# Patient Record
Sex: MALE | Race: OTHER | URBAN - METROPOLITAN AREA
[De-identification: names, ages, dates, MRNs, and addresses within clinical notes are randomized per-mention and may not be internally consistent; named-entity substitution may affect disease eponyms.]

---

## 2019-05-13 ENCOUNTER — OFFICE VISIT (OUTPATIENT)
Dept: FAMILY MEDICINE CLINIC | Age: 34
End: 2019-05-13
Payer: COMMERCIAL

## 2019-05-13 VITALS
DIASTOLIC BLOOD PRESSURE: 82 MMHG | WEIGHT: 148 LBS | SYSTOLIC BLOOD PRESSURE: 124 MMHG | TEMPERATURE: 98.5 F | HEIGHT: 67 IN | OXYGEN SATURATION: 99 % | HEART RATE: 65 BPM | BODY MASS INDEX: 23.23 KG/M2 | RESPIRATION RATE: 8 BRPM

## 2019-05-13 DIAGNOSIS — Z00.00 WELLNESS EXAMINATION: Primary | ICD-10-CM

## 2019-05-13 PROCEDURE — 99385 PREV VISIT NEW AGE 18-39: CPT | Performed by: FAMILY MEDICINE

## 2019-05-13 RX ORDER — KETOCONAZOLE 20 MG/ML
SHAMPOO TOPICAL DAILY PRN
COMMUNITY
End: 2020-06-16

## 2019-05-13 RX ORDER — KETOCONAZOLE 20 MG/G
CREAM TOPICAL DAILY
COMMUNITY
End: 2019-06-12 | Stop reason: SDUPTHER

## 2019-05-13 RX ORDER — CLOBETASOL PROPIONATE 0.05 MG/G
GEL TOPICAL 2 TIMES DAILY
COMMUNITY

## 2019-05-13 ASSESSMENT — PATIENT HEALTH QUESTIONNAIRE - PHQ9
SUM OF ALL RESPONSES TO PHQ QUESTIONS 1-9: 0
SUM OF ALL RESPONSES TO PHQ QUESTIONS 1-9: 0
SUM OF ALL RESPONSES TO PHQ9 QUESTIONS 1 & 2: 0
2. FEELING DOWN, DEPRESSED OR HOPELESS: 0
1. LITTLE INTEREST OR PLEASURE IN DOING THINGS: 0

## 2019-05-13 NOTE — PATIENT INSTRUCTIONS
1) Meet with a dermatologist.  Check with insurance plan who is network. You might have best response with one of the dermatology practices in [facility] [place] ([facility]). 2) Let your PCP know if you need any refills in the meantime. 3) Continue to find ways to work on mindfulness and stress reduction  4) Get labwork done fasting conditions in next 30 days. 5) Contact your PCP if you would like to see a physical therapist for the right ankle. --You can get your lab work, x-ray, MRI, or ultrasound at our nearest [facility] location across the parking lot at   [employer]  Lab hours ([address] Monday through Friday; 8AM - noon on Saturdays),   Ph# [phone]  Radiology hours (7:00AM - 5PM Monday through Friday only)  --Call our office in 1 week if you have not heard about the results. Or check [facility] if you have previously enrolled. Relaxation Resources  It can be very helpful to use tools like relaxed breathing, muscle relaxation, and guided imagery/visualization to cope with stress, pain, anxiety and depression. Try different techniques to find the ones that work best for you. Below are 2 websites that have several breathing, relaxation, and visualization exercises that you can listen to and download for free.    [email or web address].[email or web address] html  · Deep Breathing & Guided Relaxation Exercises (3)  · Guided Imagery/Visualization Exercises (5)  · Mindfulness & Meditation Exercises (3)  · Progressive Muscle Relaxation   · Soothing Instrumental Music (11)    [email or web address]  · Diaphragmatic Breathing   · Deep Breathing I   · Deep Breathing II   · Progressive Muscle Relaxation   · Guided Imagery: The [employer]   · Guided Imagery:  [facility]   · Relaxing Phrases   · Just This Breath   · Increasing Awareness   · Sending Thoughts Away on Clouds  · Sending Thoughts Away on Leaves  · Sorting Into Boxes -----------------------------------------------------  Below are several apps that you can download to your smartphone to help with relaxation and mood coping. [email or web address]  Platform: [employer]  Cost: Free  Your breathing has a profound effect on your body. Dot Lafleur know this fact to be true if youve ever taken deep breaths to calm yourself down when you were upset. That exercise can often make you feel more centered, and its proof that breathing is powerful. The [id number] elidia uses guided breathing exercises to help reduce symptoms of an anxiety attack. If an attack is coming or the symptoms are unbearable, slip away into a quiet room, open your elidia, and let the worry and stress slip away with each breath. Universal Breathing - Pranayama  Platform: [employer]  Cost: Free  Focused breathing exercises can help you regain composure during an anxiety attack. They can also help you prevent an anxiety attack before one starts. Pranayama breathing techniques are common in yoga and have powerful benefits. If youre a beginner, you can benefit from the elidias guided breathing instruction. Dot Lafleur learn how to breathe deeply, hold, and then release with better control. If it works for you, you can purchase the full course which gives you access to the entire program.  Breathing Zone   Platform: [employer]  Cost: $3.99   Breathing Zone uses a clinically proven therapeutic breathing exercise that decreases your heart rate, and with daily use can help manage high blood pressure.    ----------------------------------------------  Headspace: Guided Meditation and Mindfulness  Platform: [employer]  Cost: Monthly subscription starting at $12.99  This elidia provides guided meditations suitable for all levels to help improve focus, exercise mindful awareness, relieve anxiety and reduce stress.   Calm: Meditation to Relax, Focus & Sleep Better  Platform: [employer]  Cost: Monthly subscription starting at $9.99  This elidia provides guided meditations for all levels, available in different lengths (3, 5, 10, 15, 20 or 25 minutes) on a variety of topics. 10% Happier: Meditation for [employer]: iPhone  Cost: Monthly subscription starting at $9.99  This elidia was written by a [employer] and includes a variety of meditation teachers who teach meditation in an accessible way. Self-Help for Anxiety Management [facility])  Platform: iPhone & Android  Cost: Free  The Self-Help for Anxiety Management [facility]) elidia from the [employer] can help you regain control of your anxiety and emotions. Tell the elidia how youre feeling, how anxious you are, or how worried you are. Then let the elidias self-help features walk you through some calming or relaxation practices. If you want, you can connect with a social network of other [facility] users. Dont worry, the network isnt connected to larger networks like Twitter or [employer]. Stop Panic & Anxiety Help  Platform: Android  Cost: Free  If panic and anxiety attacks have a  on your life, this elidia might help you let them go. The Stop Panic & Anxiety Help Android elidia uses emotion and relaxation training audio tracks to help you fight your fears and find a state of calm. When youve overcome the attack, use the Quigo journal to record what caused the attack and how you were able to get through it. Then use this journal to learn from your experiences and prepare for the future. I Can Be Fearless by [employer]  Platform: iPhone  Cost: Free  When you were younger, your parents might have told you that you could do anything you put your mind to. This elidia might not help you be an astronaut or a world famous actress, but it can help you break through your anxiety, fears, and worries to a place of calm and confidence.  Open your Apple device and select what you want to be right now -- calm, motivated, and confident are among the options -- then let the audio hypnosis guide you through a session. Anti-Anxiety   Platform: Android  Cost: Free  You can tell the elidia your problems by taking a diagnostic quiz about your level of stress and anxiety. Using your answers, the elidia will design a custom treatment plan for you. Follow instructional self-help videos like How to Tolerate and Lessen Anxiety.  Keep a daily journal of your anxiety and worries, and track your progress as you learn to regain a sense of calm. Worry Box - Anxiety Self-Help  Platform: Android  Cost: Free  Have you ever wished you could put all your worries in a box, leave them there, and walk away? The Worry Box elidia may let you do just that. The elidia functions a lot like a journal: Write down your thoughts, anxieties, and worries, and let the elidia help you think them through. It will ask questions, give specific anxiety-reducing help, and can even direct you to help you reduce your worries and anxiety. It is all password protected, so you can feel safe sharing the details of your stresses. -----------------------------------------------    Relax Melodies  Platform: iPhone and Android  Cost: Free  Anxiety can disrupt healthy sleep patterns in more than one way. First, people who dont get enough sleep tend to feel more anxious. Then, people who are more anxious have a difficult time sleeping. Creating a calming environment may help you fall asleep and stay asleep. Relax to one of this elidias 50 sounds. Need the music to stop once youre asleep? Set a timer, and it will stop playing. Set an alarm when you need to be awake. Then, enjoy the benefits of a good nights sleep, free from anxiety. Relaxing Sounds of Nature - Lite  Platform: iPhone  Cost: Free  You can find rest and relaxation without having to travel. The elidia comes with 35 nature tracks, which include soothing classics like crickets chirping, breaking waves, and a serene lake.  You can download more free tracks to [employer], and customize a favorite combination that helps you reduce your anxiety in a peaceful setting. Allow the sounds of nature to sweep you away from your worries in the comfort of your living room, office, or bedroom. Nature Sounds Relax and Sleep  Platform: Android  Cost: Free  If you find yourself longing for the sound of the ocean to help you relax, the Lucius Hannifin and Sleep elidia is for you. Open this elidia whenever youre feeling anxious or stressed. You can select locations or sounds like the jungle, ocean, or thunder and slip away into a place of relaxation and comfort. If the sounds make you feel sleepy, even better. Use the elidia to doze off into a relaxing slumber  Calming Music to Simplicity  Platform: Android  Cost: Free  [employer] arent the only relaxing tunes in smartphone apps. Music, especially some traditional [place] music, can be relaxing and soothing. The Calming Music to Simplicity elidia contains nine traditional [place] music selections. Press play and let your worries melt away. Relax Ocean Waves Sleep by Arnica  Platform: Android  Cost: Free  Living far from a beach doesnt mean you have to be far from total relaxation. Slip on a set of headphones and drift into a distant sand-and-suds oasis. Whether youre trying to head off an anxiety attack or just need to get some good sleep after a few anxious days, the Relax Ocean Waves Sleep elidia helps you find a place of serenity.  --------------------------------------------------------------  Nitesh [place] Meditation Relaxation  Platform: Android  Cost: Free  Nitesh [place] is a traditional [facility] health system that brings together posture, breathing, and the mind to reduce anxiety. This Android elidia connects users with a library of relaxation videos that contain instructions for relaxing and clearing the mind. The videos are created by Dr. Erica Castellanos, a psychologist with more than 20 years of experience.  In addition to viewing Dr. Radha Kendrick videos, you can read a variety of articles related to anxiety, meditation, and stress management. Anxiety Free  Platform: [employer]   Cost: Free  Meditation requires mindfulness and a sense of presence in your thoughts. Hypnosis is one step beyond meditation. It works by sending signals to your brain and transforming it almost unknowingly. The creators of this elidia say that its audio recordings contain subliminal signals that speak to the subconscious with powerful effect.  Hypnosis, like meditation, requires practice, and the goal is to get each user to a place where self-hypnosis is possible in order to reduce anxiety. Relax & Rest Guided Meditations  Platform: iPhone and Android  Cost: $0.99  While group meetings and discussions are always an option, some people find relaxation more easily on their own. This elidia lets you relax in the space of your own home or office with three guided meditations. Breath Awareness Guided Meditation (5 minutes), Deep Rested Guided Meditation (13 minutes), and Whole Body Guided Relaxation (24 minutes) are designed specifically to help you relax and sink into a peaceful meditation moment. Equanimity - Meditation Timer & Tracker  Platform: [employer]   Cost: $4.99  Meditation is one way you can cope with the symptoms and side effects of anxiety. People who meditate can eventually train themselves to stay calm when feeling stressed or anxious. Equanimity - Meditation Timer & Tracker is simple and straightforward. Time each session and watch for visual light cues to let you know how long youve been meditating. Take notes about each session, and watch your progress as you learn to manage your stress and anxiety.   Virtual Hope Box  Platform: iPhone and Android  Cost: Free  The Automatic Data Box (VHB) is a smartphone application that contains simple tools to help with coping, relaxation, distraction, and positive thinking via personalized supportive audio, video, pictures, games, mindfulness exercises, positive messages and activity planning, inspirational quotes, coping statements, and other tools. Acupressure: Heal Yourself  Platform: iPhone and Android  Cost: $1.99  Acupressure is a natural healing strategy in which you target specific areas of the body in order to alleviate pain or unwanted symptoms. Acupressure can also increase blood flow, which can boost your mood and your health. This elidia helps you find your bodys acupressure points. Apply pressure on those points when youre feeling overwhelmed, and receive the positive, calming benefit  PTSD : Self-Management of Posttraumatic Stress  Platform: [employer]  Cost: Free  This elidia can help you learn about and manage symptoms that often occur after trauma. Provides information and coping skills for common kinds of posttraumatic stress symptoms and problems, including systematic relaxation and self-help techniques.

## 2019-05-13 NOTE — PROGRESS NOTES
[facility]  Progress Note  DO Bucky Latif  1985 05/13/19    Chief Complaint:   Bucky De La Cruz is a 34 y.o. male who is here for wellness. HPI:   Some increased stress in life with his work. Intermittent neck discomfort. Had right ankle sprain. When he gets anxious his vestibular neuronitis worsens. Meclizine has helped. Would like to see dermatologist for the rosacea. ROS negative for headache, vision changes, chest pain, shortness of breath, abdominal pain, urinary sx, bowel changes. Past medical, surgical, and social history reviewed. Originally from [facility]  Medications and allergies reviewed. No Known Allergies  Prior to Visit Medications    Medication Sig Taking? Authorizing Provider   clobetasol propionate 0.05 % GEL gel Apply topically 2 times daily Yes Historical Provider, MD   ketoconazole (NIZORAL) 2 % cream Apply topically daily Apply topically daily. Yes Historical Provider, MD   metroNIDAZOLE (METROCREAM) 0.75 % cream Apply topically 2 times daily Apply topically 2 times daily. Yes Historical Provider, MD   ketoconazole (NIZORAL) 2 % shampoo Apply topically daily as needed for Itching Apply topically daily as needed. Yes Historical Provider, MD          Vitals:    05/13/19 0830   BP: 124/82   Pulse: 65   Resp: 8   Temp: 98.5 °F (36.9 °C)   TempSrc: Oral   SpO2: 99%   Weight: 148 lb (67.1 kg)   Height: 5' 7\" (1.702 m)      Wt Readings from Last 3 Encounters:   05/13/19 148 lb (67.1 kg)     BP Readings from Last 3 Encounters:   05/13/19 124/82       There is no problem list on file for this patient. Immunization History   Administered Date(s) Administered    Tdap (Boostrix, Adacel) 05/15/2014       History reviewed. No pertinent past medical history.   Past Surgical History:   Procedure Laterality Date    TOOTH EXTRACTION  2016     Family History   Problem Relation Age of Onset    Heart Disease Maternal Grandfather  Cancer Paternal Uncle         skin cancer    Lung Cancer Paternal Uncle      Social History     Socioeconomic History    Marital status: Single     Spouse name: Not on file    Number of children: Not on file    Years of education: Not on file    Highest education level: Not on file   Occupational History    Not on file   Social Needs    Financial resource strain: Not on file    Food insecurity:     Worry: Not on file     Inability: Not on file    Transportation needs:     Medical: Not on file     Non-medical: Not on file   Tobacco Use    Smoking status: Never Smoker    Smokeless tobacco: Never Used   Substance and Sexual Activity    Alcohol use: Not on file    Drug use: Not on file    Sexual activity: Not on file   Lifestyle    Physical activity:     Days per week: Not on file     Minutes per session: Not on file    Stress: Not on file   Relationships    Social connections:     Talks on phone: Not on file     Gets together: Not on file     Attends Quaker service: Not on file     Active member of club or organization: Not on file     Attends meetings of clubs or organizations: Not on file     Relationship status: Not on file    Intimate partner violence:     Fear of current or ex partner: Not on file     Emotionally abused: Not on file     Physically abused: Not on file     Forced sexual activity: Not on file   Other Topics Concern    Not on file   Social History Narrative    Not on file       O: /82   Pulse 65   Temp 98.5 °F (36.9 °C) (Oral)   Resp 8   Ht 5' 7\" (1.702 m)   Wt 148 lb (67.1 kg)   SpO2 99%   BMI 23.18 kg/m²   Physical Exam  GEN: No acute distress, cooperative, well nourished, alert. HEENT: PEERLA, EOMI , normocephalic/atraumatic, nares and oropharynx clear. Mucus membranes normal, Tympanic membranes clear bilaterally. Neck: soft, supple, no thyromegaly, mass, no Lymphadenopathy  CV: Regular rate and rhythm, no murmur, rubs, gallops. No edema.   Resp: Clear to auscultation bilaterally good air entry bilaterally  No crackles, wheeze. Breathing comfortably. Psych: normal affect. Neuro: AOx3  Integument: left cheek with pustular rosacea. Scalp on vertex with seborrheic dermatitis lesions. MSK: Normal ROM of the right ankle No TTP of the lateral ankle. ASSESSMENT   Diagnosis Orders   1. Wellness examination  HIV-1 AND HIV-2 ANTIBODIES    LIPID PANEL    COMPREHENSIVE METABOLIC PANEL     Get labwork      PLAN          See rest of plan under patient instructions. Return in about 1 year (around 5/13/2020) for Wellness/Health Maintenance. Patient Instructions   1) Meet with a dermatologist.  Check with insurance plan who is network. You might have best response with one of the dermatology practices in [facility] ([facility]). 2) Let your PCP know if you need any refills in the meantime. 3) Continue to find ways to work on mindfulness and stress reduction  4) Get labwork done fasting conditions in next 30 days. 5) Contact your PCP if you would like to see a physical therapist for the right ankle. --You can get your lab work, x-ray, MRI, or ultrasound at our nearest [facility] location across the parking lot at   [address]   Lab hours ([address] Monday through Friday; 8AM - noon on Saturdays),   Ph# [phone]  Radiology hours (7:00AM - 5PM Monday through Friday only)  --Call our office in 1 week if you have not heard about the results. Or check [facility] if you have previously enrolled. Relaxation Resources  It can be very helpful to use tools like relaxed breathing, muscle relaxation, and guided imagery/visualization to cope with stress, pain, anxiety and depression. Try different techniques to find the ones that work best for you.      Below are 2 websites that have several breathing, relaxation, and visualization exercises that you can listen to and download for free.    NetworkMan [email or web address]. html  · Deep Breathing & Guided Relaxation Exercises (3)  · Guided Imagery/Visualization Exercises (5)  · Mindfulness & Meditation Exercises (3)  · Progressive Muscle Relaxation   · Soothing Instrumental Music (11)    http://Wingz. "[employer]/relax/  · Diaphragmatic Breathing   · Deep Breathing I   · Deep Breathing II   · Progressive Muscle Relaxation   · Guided Imagery: [place]   · Guided Imagery: [facility]   · Relaxing Phrases   · Just This Breath   · Increasing Awareness   · Sending Thoughts Away on Clouds  · Sending Thoughts Away on Leaves  · Sorting Into Boxes   -----------------------------------------------------  Below are several apps that you can download to your smartphone to help with relaxation and mood coping. Rxpeizr9Ghmtv  Platform: Showcase-TV  Cost: Free  Your breathing has a profound effect on your body. Katie Wilkinson know this fact to be true if youve ever taken deep breaths to calm yourself down when you were upset. That exercise can often make you feel more centered, and its proof that breathing is powerful. The Rkigibh5Efyjo elidia uses guided breathing exercises to help reduce symptoms of an anxiety attack. If an attack is coming or the symptoms are unbearable, slip away into a quiet room, open your elidia, and let the worry and stress slip away with each breath. Universal Breathing - Pranayama  Platform: Showcase-TV  Cost: Free  Focused breathing exercises can help you regain composure during an anxiety attack. They can also help you prevent an anxiety attack before one starts. Pranayama breathing techniques are common in yoga and have powerful benefits. If youre a beginner, you can benefit from the elidias guided breathing instruction. Katie Wilkinson learn how to breathe deeply, hold, and then release with better control.  If it works for you, you can purchase the full course which gives you access to the entire program.  Breathing Zone   Platform: Exitround & Android  Cost: $3.99   Breathing Zone uses a clinically proven therapeutic breathing exercise that decreases your heart rate, and with daily use can help manage high blood pressure.    ----------------------------------------------  Headspace: Guided Meditation and Mindfulness  Platform: iPhone  Cost: Monthly subscription starting at $12.99  This elidia provides guided meditations suitable for all levels to help improve focus, exercise mindful awareness, relieve anxiety and reduce stress. Calm: Meditation to Relax, Focus & Sleep Better  Platform: iPhone  Cost: Monthly subscription starting at $9.99  This elidia provides guided meditations for all levels, available in different lengths (3, 5, 10, 15, 20 or 25 minutes) on a variety of topics. 10% Happier: Meditation for [employer]: iPSjh direct marketing conceptsne  Cost: Monthly subscription starting at $9.99  This elidia was written by a [employer] and includes a variety of meditation teachers who teach meditation in an accessible way. Self-Help for Anxiety Management [facility])  Platform: iPhone & Android  Cost: Free  The Self-Help for Anxiety Management [facility]) elidia from the [employer] can help you regain control of your anxiety and emotions. Tell the elidia how youre feeling, how anxious you are, or how worried you are. Then let the elidias self-help features walk you through some calming or relaxation practices. If you want, you can connect with a social network of other [facility] users. Dont worry, the network isnt connected to larger networks like Twitter or [employer]. Stop Panic & Anxiety Help  Platform: Android  Cost: Free  If panic and anxiety attacks have a  on your life, this elidia might help you let them go. The Stop Panic & Anxiety Help Android elidia uses emotion and relaxation training audio tracks to help you fight your fears and find a state of calm.  When youve overcome the attack, use the elidias journal to record what caused the attack and how you were able to get through it. Then use this journal to learn from your experiences and prepare for the future. I Can Be Fearless by Human Progress  Platform: iPhone  Cost: Free  When you were younger, your parents might have told you that you could do anything you put your mind to. This elidia might not help you be an astronaut or a world famous actress, but it can help you break through your anxiety, fears, and worries to a place of calm and confidence. Open your Apple device and select what you want to be right now -- calm, motivated, and confident are among the options -- then let the audio hypnosis guide you through a session. Anti-Anxiety   Platform: Android  Cost: Free  You can tell the elidia your problems by taking a diagnostic quiz about your level of stress and anxiety. Using your answers, the elidia will design a custom treatment plan for you. Follow instructional self-help videos like How to Tolerate and Lessen Anxiety.  Keep a daily journal of your anxiety and worries, and track your progress as you learn to regain a sense of calm. Worry Box - Anxiety Self-Help  Platform: Android  Cost: Free  Have you ever wished you could put all your worries in a box, leave them there, and walk away? The Worry Box elidia may let you do just that. The elidia functions a lot like a journal: Write down your thoughts, anxieties, and worries, and let the elidia help you think them through. It will ask questions, give specific anxiety-reducing help, and can even direct you to help you reduce your worries and anxiety. It is all password protected, so you can feel safe sharing the details of your stresses. -----------------------------------------------    Relax Melodies  Platform: iPhone and Android  Cost: Free  Anxiety can disrupt healthy sleep patterns in more than one way. First, people who dont get enough sleep tend to feel more anxious.  Then, people who are more anxious have a difficult time sleeping. Creating a calming environment may help you fall asleep and stay asleep. Relax to one of this elidias 50 sounds. Need the music to stop once youre asleep? Set a timer, and it will stop playing. Set an alarm when you need to be awake. Then, enjoy the benefits of a good nights sleep, free from anxiety. Relaxing Sounds of Nature - Lite  Platform: iPhone  Cost: Free  You can find rest and relaxation without having to travel. The elidia comes with 35 nature tracks, which include soothing classics like crickets chirping, breaking waves, and a serene lake. You can download more free tracks to [employer], and customize a favorite combination that helps you reduce your anxiety in a peaceful setting. Allow the sounds of nature to sweep you away from your worries in the comfort of your living room, office, or bedroom. Nature Sounds Relax and Sleep  Platform: Android  Cost: Free  If you find yourself longing for the sound of the ocean to help you relax, the better.nifin and Sleep elidia is for you. Open this elidia whenever youre feeling anxious or stressed. You can select locations or sounds like the jungle, ocean, or thunder and slip away into a place of relaxation and comfort. If the sounds make you feel sleepy, even better. Use the elidia to doze off into a relaxing slumber  Calming Music to Simplicity  Platform: Android  Cost: Free  [employer] arent the only relaxing tunes in smartphone apps. Music, especially some traditional [place] music, can be relaxing and soothing. The Calming Music to Simplicity elidia contains nine traditional [place] music selections. Press play and let your worries melt away. Relax Ocean Waves Sleep by [employer]  Platform: Android  Cost: Free  Living far from a beach doesnt mean you have to be far from total relaxation. Slip on a set of headphones and drift into a distant sand-and-suds oasis.  Whether youre trying to head off an anxiety attack or just need to get some good sleep after a few anxious days, the Relax Ocean Waves Sleep elidia helps you find a place of serenity.  --------------------------------------------------------------  Arthea Shar Meditation Relaxation  Platform: Android  Cost: Free  Daisy[employer] is a traditional [place] health system that brings together posture, breathing, and the mind to reduce anxiety. This Android elidia connects users with a library of relaxation videos that contain instructions for relaxing and clearing the mind. The videos are created by Dr. Abigail Fulton, a psychologist with more than 20 years of experience. In addition to viewing Dr. Adrien Perez videos, you can read a variety of articles related to anxiety, meditation, and stress management. Anxiety Free  Platform: [employer]   Cost: Free  Meditation requires mindfulness and a sense of presence in your thoughts. Hypnosis is one step beyond meditation. It works by sending signals to your brain and transforming it almost unknowingly. The creators of this elidia say that its audio recordings contain subliminal signals that speak to the subconscious with powerful effect.  Hypnosis, like meditation, requires practice, and the goal is to get each user to a place where self-hypnosis is possible in order to reduce anxiety. Relax & Rest Guided Meditations  Platform: iPhone and TheSquareFoot  Cost: $0.99  While group meetings and discussions are always an option, some people find relaxation more easily on their own. This elidia lets you relax in the space of your own home or office with three guided meditations. Breath Awareness Guided Meditation (5 minutes), Deep Rested Guided Meditation (13 minutes), and Whole Body Guided Relaxation (24 minutes) are designed specifically to help you relax and sink into a peaceful meditation moment. Equanimity - Meditation Timer & Tracker  Platform: [employer]   Cost: $4.99  Meditation is one way you can cope with the symptoms and side effects of anxiety.  People who meditate can eventually train themselves to stay calm when feeling stressed or anxious. Equanimity - Meditation Timer & Tracker is simple and straightforward. Time each session and watch for visual light cues to let you know how long youve been meditating. Take notes about each session, and watch your progress as you learn to manage your stress and anxiety. Virtual Hope Box  Platform: TapnScraphone and Android  Cost: Free  The Automatic Data Box (VHB) is a smartphone application that contains simple tools to help with coping, relaxation, distraction, and positive thinking via personalized supportive audio, video, pictures, games, mindfulness exercises, positive messages and activity planning, inspirational quotes, coping statements, and other tools. Acupressure: Heal Yourself  Platform: iPhone and Android  Cost: $1.99  Acupressure is a natural healing strategy in which you target specific areas of the body in order to alleviate pain or unwanted symptoms. Acupressure can also increase blood flow, which can boost your mood and your health. This elidia helps you find your bodys acupressure points. Apply pressure on those points when youre feeling overwhelmed, and receive the positive, calming benefit  PTSD : Self-Management of Posttraumatic Stress  Platform: BitWave and MediaBoost  Cost: Free  This elidia can help you learn about and manage symptoms that often occur after trauma. Provides information and coping skills for common kinds of posttraumatic stress symptoms and problems, including systematic relaxation and self-help techniques. Please note a portion of this chart was generated using dragon dictation software. Although every effort was made to ensure the accuracy of this automated transcription, some errors in transcription may have occurred.

## 2019-06-12 ENCOUNTER — PATIENT MESSAGE (OUTPATIENT)
Dept: FAMILY MEDICINE CLINIC | Age: 34
End: 2019-06-12

## 2019-06-12 DIAGNOSIS — Z00.00 WELLNESS EXAMINATION: ICD-10-CM

## 2019-06-12 LAB
A/G RATIO: 2.2 (ref 1.1–2.2)
ALBUMIN SERPL-MCNC: 5.2 G/DL (ref 3.4–5)
ALP BLD-CCNC: 67 U/L (ref 40–129)
ALT SERPL-CCNC: 18 U/L (ref 10–40)
ANION GAP SERPL CALCULATED.3IONS-SCNC: 12 MMOL/L (ref 3–16)
AST SERPL-CCNC: 18 U/L (ref 15–37)
BILIRUB SERPL-MCNC: 0.6 MG/DL (ref 0–1)
BUN BLDV-MCNC: 14 MG/DL (ref 7–20)
CALCIUM SERPL-MCNC: 9.7 MG/DL (ref 8.3–10.6)
CHLORIDE BLD-SCNC: 105 MMOL/L (ref 99–110)
CHOLESTEROL, TOTAL: 202 MG/DL (ref 0–199)
CO2: 25 MMOL/L (ref 21–32)
CREAT SERPL-MCNC: 0.9 MG/DL (ref 0.9–1.3)
GFR AFRICAN AMERICAN: >60
GFR NON-AFRICAN AMERICAN: >60
GLOBULIN: 2.4 G/DL
GLUCOSE BLD-MCNC: 96 MG/DL (ref 70–99)
HDLC SERPL-MCNC: 76 MG/DL (ref 40–60)
LDL CHOLESTEROL CALCULATED: 113 MG/DL
POTASSIUM SERPL-SCNC: 4.5 MMOL/L (ref 3.5–5.1)
SODIUM BLD-SCNC: 142 MMOL/L (ref 136–145)
TOTAL PROTEIN: 7.6 G/DL (ref 6.4–8.2)
TRIGL SERPL-MCNC: 63 MG/DL (ref 0–150)
VLDLC SERPL CALC-MCNC: 13 MG/DL

## 2019-06-12 NOTE — TELEPHONE ENCOUNTER
From: Mari Houston  To: Elvira Gibson DO  Sent: 6/12/2019 1:34 PM EDT  Subject: Non-Urgent Medical Question    Julius Gallardo,     I need a refill for my metrocream and ketoconazole creams, but the webpage is not letting me place a request.     What should I do?      Thanks,

## 2019-06-13 LAB
HIV AG/AB: NORMAL
HIV ANTIGEN: NORMAL
HIV-1 ANTIBODY: NORMAL
HIV-2 AB: NORMAL

## 2019-06-14 RX ORDER — KETOCONAZOLE 20 MG/G
CREAM TOPICAL DAILY
Qty: 30 G | Refills: 0 | Status: SHIPPED | OUTPATIENT
Start: 2019-06-14

## 2020-06-16 ENCOUNTER — OFFICE VISIT (OUTPATIENT)
Dept: FAMILY MEDICINE CLINIC | Age: 35
End: 2020-06-16
Payer: COMMERCIAL

## 2020-06-16 VITALS
HEART RATE: 59 BPM | OXYGEN SATURATION: 98 % | HEIGHT: 67 IN | TEMPERATURE: 98.5 F | RESPIRATION RATE: 18 BRPM | BODY MASS INDEX: 23.42 KG/M2 | DIASTOLIC BLOOD PRESSURE: 71 MMHG | WEIGHT: 149.2 LBS | SYSTOLIC BLOOD PRESSURE: 120 MMHG

## 2020-06-16 PROCEDURE — 99213 OFFICE O/P EST LOW 20 MIN: CPT | Performed by: FAMILY MEDICINE

## 2020-06-16 RX ORDER — DOXYCYCLINE HYCLATE 100 MG/1
100 CAPSULE ORAL 2 TIMES DAILY
COMMUNITY

## 2020-06-16 ASSESSMENT — PATIENT HEALTH QUESTIONNAIRE - PHQ9
SUM OF ALL RESPONSES TO PHQ QUESTIONS 1-9: 0
2. FEELING DOWN, DEPRESSED OR HOPELESS: 0
SUM OF ALL RESPONSES TO PHQ9 QUESTIONS 1 & 2: 0
SUM OF ALL RESPONSES TO PHQ QUESTIONS 1-9: 0
1. LITTLE INTEREST OR PLEASURE IN DOING THINGS: 0

## 2020-06-16 NOTE — PATIENT INSTRUCTIONS
--Call to meet with Dr. Kari uDenas. --Keep up partnership with Ranjit Nix, [facility]    --Call to set up eye clinic appt. --You can get your lab work or x-ray done at [facility]. LAB: [address]  Lab hours (7AM - 5PM Monday through Friday; 8AM - noon on Saturdays),   Ph# [phone]    X-RAY: [address]  Radiology hours, (7:00AM - 5PM Monday through Friday only)  Ph# [phone] or [phone]  --Call our office in 1 week if you have not heard about the results. Or check [facility] if you have previously enrolled. Address  [facility] Location  [address] [place] [address], [address] [place] [address]  Phone: [phone]  Fax: [phone]  Hours  Monday: 8:30 am - 5 pm  Tuesday: 8:30 am - 5 pm  Wednesday: 8:30 am - 3 pm  Thursday: 8:30 am - 5 pm  Friday: 8:30 am - 5 pm  Doctors at this Location  Jax Patricia M.D. Glennda Riggers A. Zoila Riedel, M.D.

## 2020-06-16 NOTE — PROGRESS NOTES
bilaterally. Neck: soft, supple, no thyromegaly,mass, no Lymphadenopathy  CV: Regular rate and rhythm, no murmur, rubs, gallops. No edema. Resp: Clear to auscultation bilaterally good air entry bilaterally  No crackles, wheeze. Breathing comfortably. Psych:normal affect. Neuro: AOx3          ASSESSMENT   Diagnosis Orders   1. Chronic pain of right ankle  Magda Daly MD, Orthopedic Surgery, [facility]       Still painful after 1 year. Wants to return back to regular competitive tennis play. PLAN          If applicable, see additional patient information and instructions under \"Patient Instructions. \"    Return if symptoms worsen or fail to improve. Patient Instructions   --Call to meet with Dr. Steph Haynes. --Keep up partnership with [facility] [address] [place] [address] Dr    --Call to set up eye clinic appt. --You can get your lab work or x-ray done at [facility]. LAB: [address]  Lab hours (7AM - 5PM Monday through Friday; 8AM - noon on Saturdays),   Ph# [phone]    X-RAY: [address]  Radiology hours, (7:00AM - 5PM Monday through Friday only)  Ph# [phone] or [phone]  --Call our office in 1 week if you have not heard about the results. Or check [facility] if you have previously enrolled. Address  [facility] Location  [address] [place] [address], [address] [place] [address]  Phone: [phone]  Fax: [phone]  Hours  Monday: 8:30 am - 5 pm  Tuesday: 8:30 am - 5 pm  Wednesday: 8:30 am - 3 pm  Thursday: 8:30 am - 5 pm  Friday: 8:30 am - 5 pm  Doctors at this Location  FIDEL Wiggins M.D. Please note a portion of this chart was generated using dragon dictation software. Although every effort was made to ensure the accuracy of this automated transcription,some errors in transcription may have occurred.

## 2021-06-21 ENCOUNTER — OFFICE VISIT (OUTPATIENT)
Dept: FAMILY MEDICINE CLINIC | Age: 36
End: 2021-06-21
Payer: COMMERCIAL

## 2021-06-21 VITALS
RESPIRATION RATE: 18 BRPM | OXYGEN SATURATION: 99 % | HEIGHT: 67 IN | WEIGHT: 145.4 LBS | TEMPERATURE: 97.5 F | BODY MASS INDEX: 22.82 KG/M2 | DIASTOLIC BLOOD PRESSURE: 76 MMHG | HEART RATE: 55 BPM | SYSTOLIC BLOOD PRESSURE: 124 MMHG

## 2021-06-21 DIAGNOSIS — Z00.00 ROUTINE GENERAL MEDICAL EXAMINATION AT A HEALTH CARE FACILITY: Primary | ICD-10-CM

## 2021-06-21 DIAGNOSIS — R53.83 DECREASED STAMINA: ICD-10-CM

## 2021-06-21 PROCEDURE — 99395 PREV VISIT EST AGE 18-39: CPT | Performed by: FAMILY MEDICINE

## 2021-06-21 SDOH — ECONOMIC STABILITY: FOOD INSECURITY: WITHIN THE PAST 12 MONTHS, THE FOOD YOU BOUGHT JUST DIDN'T LAST AND YOU DIDN'T HAVE MONEY TO GET MORE.: NEVER TRUE

## 2021-06-21 SDOH — ECONOMIC STABILITY: FOOD INSECURITY: WITHIN THE PAST 12 MONTHS, YOU WORRIED THAT YOUR FOOD WOULD RUN OUT BEFORE YOU GOT MONEY TO BUY MORE.: NEVER TRUE

## 2021-06-21 ASSESSMENT — PATIENT HEALTH QUESTIONNAIRE - PHQ9
SUM OF ALL RESPONSES TO PHQ QUESTIONS 1-9: 0
SUM OF ALL RESPONSES TO PHQ QUESTIONS 1-9: 0
1. LITTLE INTEREST OR PLEASURE IN DOING THINGS: 0
SUM OF ALL RESPONSES TO PHQ9 QUESTIONS 1 & 2: 0
2. FEELING DOWN, DEPRESSED OR HOPELESS: 0
SUM OF ALL RESPONSES TO PHQ QUESTIONS 1-9: 0

## 2021-06-21 ASSESSMENT — SOCIAL DETERMINANTS OF HEALTH (SDOH): HOW HARD IS IT FOR YOU TO PAY FOR THE VERY BASICS LIKE FOOD, HOUSING, MEDICAL CARE, AND HEATING?: NOT HARD AT ALL

## 2021-06-21 NOTE — PROGRESS NOTES
[facility] Family Medicine  TELEMEDICINE Progress Note  Betzaida Reddy DO      The risks and benefits of converting to a virtual visit were discussed in light of the current infectious disease epidemic. Patient also understood that insurance coverage and co-pays are up to their individual insurance plans. Patient identification was verified at the start of the visit. Mo Lawrence  1985 06/21/21    Patient location: Home address on file  Provider location: Physician's home    Chief Complaint:   Mo Lawrence is a 37 y.o. patient who is here for yearly physical        HPI:     Otherwise has been healthy and continues to play competitive tennis. When [place] to [place] he had to see a urologist for scrotum condition. Computer medical record reviewed with Jose Reyes today. This has since resolved from October 2020. Additionally has some occasional upset stomach on the doxycycline 100 mg which at this time is taking daily as prescribed by his dermatologist.  Has to take Tums periodically a couple of times a month. Denies alarm features of blood in the toilet bowl nor vomiting. Also interested to check some blood work on hormone levels as he finds times it takes longer for him to recover from competitive matches his it did 5 years ago. Lastly has toenail concerns he wanted me to examine. ROS negative for headache, vision changes, chest pain, shortness of breath, abdominal pain, urinary sx, bowel changes. Past medical, surgical, and social history reviewed. Medications and allergies reviewed. Works from home. Allergies   Allergen Reactions    Sulfa Antibiotics Rash     Prior to Visit Medications    Medication Sig Taking? Authorizing Provider   doxycycline hyclate (VIBRAMYCIN) 100 MG capsule Take 100 mg by mouth 2 times daily Yes Historical Provider, MD   metroNIDAZOLE (METROCREAM) 0.75 % cream Apply topically 2 times daily Apply topically 2 times daily.  Yes Rohan Spencer Lebron, DO   ketoconazole (NIZORAL) 2 % cream Apply topically daily Apply topically daily. Yes Esperanza Diana, DO   clobetasol propionate 0.05 % GEL gel Apply topically 2 times daily Yes Historical Provider, MD          No flowsheet data found. Vitals:    06/21/21 1014   BP: 124/76   Pulse: 55   Resp: 18   Temp: 97.5 °F (36.4 °C)   SpO2: 99%   Weight: 145 lb 6.4 oz (66 kg)   Height: 5' 6.54\" (1.69 m)      Wt Readings from Last 3 Encounters:   06/21/21 145 lb 6.4 oz (66 kg)   06/16/20 149 lb 3.2 oz (67.7 kg)   05/13/19 148 lb (67.1 kg)     BP Readings from Last 3 Encounters:   06/21/21 124/76   06/16/20 120/71   05/13/19 124/82       There is no problem list on file for this patient. Immunization History   Administered Date(s) Administered    COVID-19, Pfizer, PF, 30mcg/0.3mL 04/07/2021, 04/29/2021    Tdap (Boostrix, Adacel) 05/15/2014       No past medical history on file.   Past Surgical History:   Procedure Laterality Date    TOOTH EXTRACTION  2016     Family History   Problem Relation Age of Onset    Heart Disease Maternal Grandfather     Cancer Paternal Uncle         skin cancer    Lung Cancer Paternal Uncle      Social History     Socioeconomic History    Marital status: Single     Spouse name: Not on file    Number of children: Not on file    Years of education: Not on file    Highest education level: Not on file   Occupational History    Not on file   Tobacco Use    Smoking status: Never Smoker    Smokeless tobacco: Never Used   Substance and Sexual Activity    Alcohol use: Yes     Comment: 3-4/Week    Drug use: Not on file    Sexual activity: Not on file   Other Topics Concern    Not on file   Social History Narrative    Not on file     Social Determinants of Health     Financial Resource Strain: Low Risk     Difficulty of Paying Living Expenses: Not hard at all   Food Insecurity: No Food Insecurity    Worried About [address] in the Last Year: Never true    Ran Out of Food in the Last Year: Never true   Transportation Needs:     Lack of Transportation (Medical):  Lack of Transportation (Non-Medical):    Physical Activity:     Days of Exercise per Week:     Minutes of Exercise per Session:    Stress:     Feeling of Stress :    Social Connections:     Frequency of Communication with Friends and Family:     Frequency of Social Gatherings with Friends and Family:     Attends Taoist Services:     Active Member of Clubs or Organizations:     Attends Club or Organization Meetings:     Marital Status:    Intimate Partner Violence:     Fear of Current or Ex-Partner:     Emotionally Abused:     Physically Abused:     Sexually Abused:        O: /76   Pulse 55   Temp 97.5 °F (36.4 °C)   Resp 18   Ht 5' 6.54\" (1.69 m)   Wt 145 lb 6.4 oz (66 kg)   SpO2 99%   BMI 23.09 kg/m²   Physical Exam  PHYSICAL EXAMINATION:  [ INSTRUCTIONS:  \"[x]\" Indicates a positive item  \"[]\" Indicates a negative item  -- DELETE ALL ITEMS NOT EXAMINED]  Vital Signs: (As obtained by patient/caregiver or practitioner observation)    Constitutional: [x] Appears well-developed and well-nourished [x] No apparent distress      [] Abnormal-   Mental status  [x] Alert and awake  [x] Oriented to person/place/time [x]Able to follow commands      Eyes:  EOM    [x]  Normal  [] Abnormal-  Sclera  [x]  Normal  [] Abnormal -         Discharge [x]  None visible  [] Abnormal -    HENT:   [x] Normocephalic, atraumatic.   [] Abnormal   [] Mouth/Throat: Mucous membranes are moist.     External Ears [x] Normal  [] Abnormal-     Neck: [x] No visualized mass     Pulmonary/Chest: [x] Respiratory effort normal.  [x] No visualized signs of difficulty breathing or respiratory distress        [] Abnormal-      Musculoskeletal:   [] Normal gait with no signs of ataxia         [x] Normal range of motion of neck        [] Abnormal-       Neurological:        [x] No Facial Asymmetry (Cranial nerve 7 motor function) (limited exam to video visit)          [x] No gaze palsy        [] Abnormal-         Skin:        [x] No significant exanthematous lesions or discoloration noted on facial skin         [] Abnormal-            Psychiatric:       [x] Normal Affect [] No Hallucinations        [] Abnormal-     Other pertinent observable physical exam findings- Heart: Normal S1 and S2 with regular rhythm. Lungs: Clear to auscultation bilaterally. Abd: No tenderness to palpation. Examining the left foot I am able to see what appears to be contusion like findings of the left great toenail and left third toenail. Due to this being a TeleHealth encounter, evaluation of the following organ systems is limited: Vitals/Constitutional/EENT/Resp/CV/GI//MS/Neuro/Skin/Heme-Lymph-Imm. ASSESSMENT   Diagnosis Orders   1. Routine general medical examination at a health care facility  COMPREHENSIVE METABOLIC PANEL    CBC    LIPID PANEL   2. Decreased stamina  Testosterone, free, total    TSH with Reflex       #2: check labwork. PLAN          Time spent on encounter (including any number of the following: review of labs, imaging, provider notes, outside hospital records; performing examination/evaluation; counseling patient and family; ordering medications/tests; placing referrals and communication with referring physicians; coordination of care, and documentation in the EHR): 40 minutes  Established E/M: 10-19 (22710), 20-29 (82739), 30-39 (01522), 40-54 (95257)   New E/M: 15-29 (60133), 30-44 (65842), 45-59 (98760), 60-74 (21981)  Telephone E/M: 5-10 (), 11-20 (29626), 21-30 (56105)    If applicable, see additional patient information and instructions under \"Patient Instructions. \"    Return in about 1 year (around 6/21/2022) for Wellness/Health Maintenance. Patient Instructions   IF THE INFECTION OCCURS AGAIN, CONTACT PCP AND WILL CONSIDER MEDICATION AND ULTRASOUND.     IT MIGHT BE WORTH DISCUSSING WITH DERMATOLOGY TO TAKE DAILY 50 MG DOXYCYCLINE OR TAKE 100 MG EVERY OTHER DAY. IT THE DIGESTIVE DISCOMFORT CONTINUES OR WORSENS, LET YOUR PCP KNOW AND HE CAN ORDER A \"BARIUM SWALLOW, AKA ESOPHGRAM\" OR REFER YOU TO A GASTROENTEROLOGIST.    YOUR PCP IS FINE WITH THE TUMS (CALCIUM CARBONATE). THE TOE NAILS ARE LIKELY AFFECTED BY THE FORCEFUL COMPETITIVE TENNIS WITH THE REPEATED RUNNING. YOU COULD TRY THE TOE NAIL FUNGUS Malissa Saha. Please note a portion of this chart was generated using dragon dictation software. Although every effort was made to ensure the accuracy of this automated transcription, some errors in transcription may have occurred. Pursuant to the emergency declaration under the [facility], 1135 waiver authority and the [employer] and [employer] Act, this Virtual  Visit was conducted, with patient's consent, to reduce the patient's risk of exposure to COVID-19 and provide continuity of care for an established patient. Services were provided through a video synchronous discussion virtually to substitute for in-person clinic visit. Patient was instructed that the AVS is available on My Chart or was emailed to the patient if not on My Chart. Lab orders were emailed to patient if they do not use a [facility] lab. Any work notes were sent to patient through My Chart or email.

## 2021-06-21 NOTE — PATIENT INSTRUCTIONS
IF THE INFECTION OCCURS AGAIN, CONTACT PCP AND WILL CONSIDER MEDICATION AND ULTRASOUND. IT MIGHT BE WORTH DISCUSSING WITH DERMATOLOGY TO TAKE DAILY 50 MG DOXYCYCLINE OR TAKE 100 MG EVERY OTHER DAY. IT THE DIGESTIVE DISCOMFORT CONTINUES OR WORSENS, LET YOUR PCP KNOW AND HE CAN ORDER A \"BARIUM SWALLOW, AKA ESOPHGRAM\" OR REFER YOU TO A GASTROENTEROLOGIST.    YOUR PCP IS FINE WITH THE TUMS (CALCIUM CARBONATE). THE TOE NAILS ARE LIKELY AFFECTED BY THE FORCEFUL COMPETITIVE TENNIS WITH THE REPEATED RUNNING. YOU COULD TRY THE TOE NAIL FUNGUS Trace Murphy.

## 2021-07-14 DIAGNOSIS — R53.83 DECREASED STAMINA: ICD-10-CM

## 2021-07-14 DIAGNOSIS — Z00.00 ROUTINE GENERAL MEDICAL EXAMINATION AT A HEALTH CARE FACILITY: ICD-10-CM

## 2021-07-14 LAB
A/G RATIO: 2.1 (ref 1.1–2.2)
ALBUMIN SERPL-MCNC: 5 G/DL (ref 3.4–5)
ALP BLD-CCNC: 68 U/L (ref 40–129)
ALT SERPL-CCNC: 19 U/L (ref 10–40)
ANION GAP SERPL CALCULATED.3IONS-SCNC: 14 MMOL/L (ref 3–16)
AST SERPL-CCNC: 18 U/L (ref 15–37)
BILIRUB SERPL-MCNC: 0.7 MG/DL (ref 0–1)
BUN BLDV-MCNC: 14 MG/DL (ref 7–20)
CALCIUM SERPL-MCNC: 9.5 MG/DL (ref 8.3–10.6)
CHLORIDE BLD-SCNC: 102 MMOL/L (ref 99–110)
CHOLESTEROL, TOTAL: 218 MG/DL (ref 0–199)
CO2: 25 MMOL/L (ref 21–32)
CREAT SERPL-MCNC: 1 MG/DL (ref 0.9–1.3)
GFR AFRICAN AMERICAN: >60
GFR NON-AFRICAN AMERICAN: >60
GLOBULIN: 2.4 G/DL
GLUCOSE BLD-MCNC: 86 MG/DL (ref 70–99)
HCT VFR BLD CALC: 45.8 % (ref 40.5–52.5)
HDLC SERPL-MCNC: 72 MG/DL (ref 40–60)
HEMOGLOBIN: 15.7 G/DL (ref 13.5–17.5)
LDL CHOLESTEROL CALCULATED: 134 MG/DL
MCH RBC QN AUTO: 30.8 PG (ref 26–34)
MCHC RBC AUTO-ENTMCNC: 34.3 G/DL (ref 31–36)
MCV RBC AUTO: 89.7 FL (ref 80–100)
PDW BLD-RTO: 13.4 % (ref 12.4–15.4)
PLATELET # BLD: 148 K/UL (ref 135–450)
PMV BLD AUTO: 10.7 FL (ref 5–10.5)
POTASSIUM SERPL-SCNC: 4.4 MMOL/L (ref 3.5–5.1)
RBC # BLD: 5.1 M/UL (ref 4.2–5.9)
SODIUM BLD-SCNC: 141 MMOL/L (ref 136–145)
TOTAL PROTEIN: 7.4 G/DL (ref 6.4–8.2)
TRIGL SERPL-MCNC: 59 MG/DL (ref 0–150)
TSH REFLEX: 1.18 UIU/ML (ref 0.27–4.2)
VLDLC SERPL CALC-MCNC: 12 MG/DL
WBC # BLD: 6.1 K/UL (ref 4–11)

## 2021-07-16 LAB
SEX HORMONE BINDING GLOBULIN: 28 NMOL/L (ref 11–80)
TESTOSTERONE FREE-NONMALE: 103 PG/ML (ref 47–244)
TESTOSTERONE TOTAL: 454 NG/DL (ref 220–1000)

## 2022-02-22 ENCOUNTER — TELEPHONE (OUTPATIENT)
Dept: FAMILY MEDICINE CLINIC | Age: 37
End: 2022-02-22

## 2022-02-22 NOTE — TELEPHONE ENCOUNTER
Left message for patient that 06/27/2 appt. Is canceledwith Dr. Wendy Pearson, since he is leaving the practice on 03/10/22. Provided [phone] for immediate needs and provided  [id number] [employer] site for finding a new PCP.

## 2023-03-13 SDOH — HEALTH STABILITY: PHYSICAL HEALTH: ON AVERAGE, HOW MANY DAYS PER WEEK DO YOU ENGAGE IN MODERATE TO STRENUOUS EXERCISE (LIKE A BRISK WALK)?: 4 DAYS

## 2023-03-13 SDOH — HEALTH STABILITY: PHYSICAL HEALTH: ON AVERAGE, HOW MANY MINUTES DO YOU ENGAGE IN EXERCISE AT THIS LEVEL?: 60 MIN

## 2023-03-14 ENCOUNTER — OFFICE VISIT (OUTPATIENT)
Dept: PRIMARY CARE CLINIC | Age: 38
End: 2023-03-14
Payer: COMMERCIAL

## 2023-03-14 VITALS
SYSTOLIC BLOOD PRESSURE: 118 MMHG | OXYGEN SATURATION: 98 % | DIASTOLIC BLOOD PRESSURE: 70 MMHG | BODY MASS INDEX: 23.57 KG/M2 | WEIGHT: 150.2 LBS | HEART RATE: 75 BPM | TEMPERATURE: 98 F | HEIGHT: 67 IN

## 2023-03-14 DIAGNOSIS — F41.9 ANXIETY: ICD-10-CM

## 2023-03-14 DIAGNOSIS — K92.1 BLOOD IN STOOL: Primary | ICD-10-CM

## 2023-03-14 DIAGNOSIS — E73.9 LACTOSE INTOLERANCE: ICD-10-CM

## 2023-03-14 DIAGNOSIS — L71.9 ROSACEA: ICD-10-CM

## 2023-03-14 PROCEDURE — 99214 OFFICE O/P EST MOD 30 MIN: CPT | Performed by: FAMILY MEDICINE

## 2023-03-14 RX ORDER — HYDROXYZINE HYDROCHLORIDE 25 MG/1
TABLET, FILM COATED ORAL
Qty: 30 TABLET | Refills: 3 | Status: SHIPPED | OUTPATIENT
Start: 2023-03-14

## 2023-03-14 SDOH — ECONOMIC STABILITY: FOOD INSECURITY: WITHIN THE PAST 12 MONTHS, THE FOOD YOU BOUGHT JUST DIDN'T LAST AND YOU DIDN'T HAVE MONEY TO GET MORE.: NEVER TRUE

## 2023-03-14 SDOH — ECONOMIC STABILITY: HOUSING INSECURITY
IN THE LAST 12 MONTHS, WAS THERE A TIME WHEN YOU DID NOT HAVE A STEADY PLACE TO SLEEP OR SLEPT IN A SHELTER (INCLUDING NOW)?: NO

## 2023-03-14 SDOH — ECONOMIC STABILITY: FOOD INSECURITY: WITHIN THE PAST 12 MONTHS, YOU WORRIED THAT YOUR FOOD WOULD RUN OUT BEFORE YOU GOT MONEY TO BUY MORE.: NEVER TRUE

## 2023-03-14 SDOH — ECONOMIC STABILITY: INCOME INSECURITY: HOW HARD IS IT FOR YOU TO PAY FOR THE VERY BASICS LIKE FOOD, HOUSING, MEDICAL CARE, AND HEATING?: NOT HARD AT ALL

## 2023-03-14 ASSESSMENT — ENCOUNTER SYMPTOMS
CONSTIPATION: 0
SORE THROAT: 0
COUGH: 0
DIARRHEA: 0
SHORTNESS OF BREATH: 0
BLOOD IN STOOL: 1
EYE PAIN: 0
NAUSEA: 0
EYE ITCHING: 0
VOMITING: 0
ABDOMINAL PAIN: 0
WHEEZING: 0

## 2023-03-14 ASSESSMENT — PATIENT HEALTH QUESTIONNAIRE - PHQ9
2. FEELING DOWN, DEPRESSED OR HOPELESS: 0
SUM OF ALL RESPONSES TO PHQ QUESTIONS 1-9: 0
1. LITTLE INTEREST OR PLEASURE IN DOING THINGS: 0
SUM OF ALL RESPONSES TO PHQ9 QUESTIONS 1 & 2: 0
SUM OF ALL RESPONSES TO PHQ QUESTIONS 1-9: 0

## 2023-03-14 NOTE — PROGRESS NOTES
[address] [facility] [address] PRIMARY CARE  [address] [facility] [address]  Dept: [phone]  Dept Fax: [phone]     3/14/2023      Christine Al   1985     Chief Complaint   Patient presents with    Annual Exam     Former PCP retired        HPI  Pt comes in today as a NP to establish care. He has been mostly healthy. Bulk of his past medical issues have been with skin (rosacea) and dental related. He had been doing well, but noted new onset blood on stoop, in water and on TP with wiping. Bms not painful to pass. He has noticed this multiple times since then. He looked/felt in anal area and feels that there is a swollen piece of tissue back there. He reports currently dealing with more loose stools than hard stools. He does chronically reports episodes of fluctuation between loose stools and solid/formed stools. He has no past hx of this issue. Also, he reports anxiety. This is something that has seemed to be worse in last 6 months. Most of the time he has anxiety at night when trying to get to sleep. He does feel that it causes disruption in his sleep. He has had anxiety attack before in past.    Of note, he is travelling to [place] this Friday for 2 weeks for wedding planning. He is getting  there in June of this year - that is where his wife is from. PHQ Scores 3/14/2023 6/21/2021 6/16/2020 5/13/2019   PHQ2 Score 0 0 0 0   PHQ9 Score 0 0 0 0     Interpretation of Total Score Depression Severity: 1-4 = Minimal depression, 5-9 = Mild depression, 10-14 = Moderate depression, 15-19 = Moderately severe depression, 20-27 = Severe depression     Prior to Visit Medications    Medication Sig Taking? Authorizing Provider   doxycycline hyclate (VIBRAMYCIN) 100 MG capsule Take 100 mg by mouth 2 times daily  Historical Provider, MD   metroNIDAZOLE (METROCREAM) 0.75 % cream Apply topically 2 times daily Apply topically 2 times daily.   Roselia Nunez, DO ketoconazole (NIZORAL) 2 % cream Apply topically daily Apply topically daily. Kevin Regalado Lebron, DO   clobetasol propionate 0.05 % GEL gel Apply topically 2 times daily  Historical Provider, MD       Past Medical History:   Diagnosis Date    Rosacea 3/14/2023        Social History     Tobacco Use    Smoking status: Never    Smokeless tobacco: Never    Tobacco comments:     I don't smoke   Substance Use Topics    Alcohol use: Yes     Alcohol/week: 4.0 standard drinks     Types: 4 Cans of beer per week     Comment: 3-4/Week    Drug use: Never        Past Surgical History:   Procedure Laterality Date    DENTAL SURGERY          Allergies   Allergen Reactions    Sulfa Antibiotics Rash        Family History   Problem Relation Age of Onset    Cancer Paternal Uncle         skin cancer    Lung Cancer Paternal Uncle     Heart Disease Maternal Grandfather         Patient's past medical history, surgical history, family history, medications, and allergies  were all reviewed and updated as appropriate today. Review of Systems   Constitutional:  Negative for fatigue, fever and unexpected weight change. HENT:  Negative for congestion, ear pain and sore throat. Eyes:  Negative for pain, itching and visual disturbance. Respiratory:  Negative for cough, shortness of breath and wheezing. Cardiovascular:  Negative for chest pain, palpitations and leg swelling. Gastrointestinal:  Positive for blood in stool. Negative for abdominal pain, constipation, diarrhea, nausea and vomiting. Endocrine: Negative for cold intolerance, heat intolerance, polydipsia and polyuria. Genitourinary:  Negative for dysuria, frequency and hematuria. Musculoskeletal:  Negative for arthralgias and joint swelling. Skin:  Negative for rash. Neurological:  Negative for dizziness and headaches. Psychiatric/Behavioral:  The patient is nervous/anxious.       /70 (Cuff Size: Medium Adult)   Pulse 75   Temp 98 °F (36.7 °C) (Oral)   Ht 5' 6.5\" (1.689 m)   Wt 150 lb 3.2 oz (68.1 kg)   SpO2 98% Comment: room air  BMI 23.88 kg/m²      Physical Exam  Vitals reviewed. Constitutional:       General: He is not in acute distress. Appearance: Normal appearance. He is well-developed and normal weight. HENT:      Head: Normocephalic and atraumatic. Right Ear: Tympanic membrane and ear canal normal. No drainage. No middle ear effusion. Tympanic membrane is not erythematous. Left Ear: Tympanic membrane and ear canal normal. No drainage. No middle ear effusion. Tympanic membrane is not erythematous. Nose: Nose normal. No rhinorrhea. Mouth/Throat:      Mouth: Mucous membranes are moist.      Pharynx: No oropharyngeal exudate or posterior oropharyngeal erythema. Eyes:      Extraocular Movements: Extraocular movements intact. Pupils: Pupils are equal, round, and reactive to light. Neck:      Thyroid: No thyromegaly. Cardiovascular:      Rate and Rhythm: Normal rate and regular rhythm. Heart sounds: No murmur heard. Pulmonary:      Effort: Pulmonary effort is normal.      Breath sounds: Normal breath sounds. No wheezing. Abdominal:      General: Bowel sounds are normal.      Palpations: Abdomen is soft. There is no mass. Tenderness: There is no abdominal tenderness. Genitourinary:     Comments: Deferred exam today  Musculoskeletal:         General: No swelling. Normal range of motion. Cervical back: Neck supple. Lymphadenopathy:      Cervical: No cervical adenopathy. Skin:     General: Skin is warm and dry. Findings: No rash. Neurological:      General: No focal deficit present. Mental Status: He is alert and oriented to person, place, and time. Cranial Nerves: No cranial nerve deficit. Psychiatric:         Mood and Affect: Mood normal.         Behavior: Behavior normal.         Thought Content:  Thought content normal.         Judgment: Judgment normal. Assessment:  Encounter Diagnoses   Name Primary? Blood in stool - suspected acute hemorrhoid Yes    Anxiety - situational/panic attack and nighttime anxiety     Rosacea     Lactose intolerance        Plan:  1. Blood in stool - suspected acute hemorrhoid  NP with acute onset symptoms regarding this as above in HPI. Concerns of acute hemorrhoid. Will urgently have him see CRS tomorrow given upcoming travel out of country. - Lili Loyola MD, Colorectal Surgery, [place]    2. Anxiety - situational/panic attack and nighttime anxiety  Concerns of situational anxiety. Offered prn Atarax with instructions on proper use. Discussed possible side effects. Will f/u prn.  - hydrOXYzine HCl (ATARAX) 25 MG tablet; Take 1 tablet at night before bed for anxiety/sleep. During the day can take 1/2 - 1 tablet up to twice daily as needed for anxiety  Dispense: 30 tablet; Refill: 3    3. Rosacea  Continue per Derm. 4. Lactose intolerance      Return in about 5 months (around 8/14/2023) for Physical.               Jacky Tanner, DO     Please note that this chart was generated using dragon dictation software. Although every effort was made to ensure the accuracy of this automated transcription, some errors in transcription may have occurred.

## 2023-03-14 NOTE — PATIENT INSTRUCTIONS
[facility] Locations - No appointment necessary. @ indicates the location is open Saturdays in addition to Monday through Friday. Call your preferred location for test preparation, business hours and other information you need. [employer] accepts [employer]. [facility]     @ [address] [facility] [address] [facility]. [place], [address] [facility] [address]    Ph: [address]   [address] [facility], [address]    Ph: [phone]   @ [place] [address].,    [facility]    Ph: [address]    Ph: [phone]  @ [address] [place] [address]   Ph: [phone]  @ [address] [place] [address]. [address] [facility] [address]    Ph: [address] [address] [facility] [address]   Ph: [phone]    [employer]    @ [facility]. [place], [place] [address]    Ph: [phone]  [address]   Ph: [address]: [address] [facility] [address]   [employer], [place]    Ph: [phone]   [address] [facility]. [address], [place]   Ph: [address] [address] [place], [place] [address]    Ph: [phone]

## 2023-03-15 ENCOUNTER — TELEPHONE (OUTPATIENT)
Dept: SURGERY | Age: 38
End: 2023-03-15

## 2023-03-15 ENCOUNTER — OFFICE VISIT (OUTPATIENT)
Dept: SURGERY | Age: 38
End: 2023-03-15
Payer: COMMERCIAL

## 2023-03-15 VITALS
HEIGHT: 67 IN | OXYGEN SATURATION: 100 % | HEART RATE: 74 BPM | TEMPERATURE: 98.2 F | DIASTOLIC BLOOD PRESSURE: 79 MMHG | WEIGHT: 152 LBS | RESPIRATION RATE: 16 BRPM | SYSTOLIC BLOOD PRESSURE: 138 MMHG | BODY MASS INDEX: 23.86 KG/M2

## 2023-03-15 DIAGNOSIS — K60.2 ANAL FISSURE: Primary | ICD-10-CM

## 2023-03-15 PROCEDURE — 99204 OFFICE O/P NEW MOD 45 MIN: CPT | Performed by: SURGERY

## 2023-03-15 NOTE — Clinical Note
Suspect anal fissure based on tenderness, but difficult to visualize. Will treat for fissure and reassess in 6 wks. Thanks! Malick

## 2023-03-15 NOTE — PATIENT INSTRUCTIONS
Anal Fissure: Information and Care Instructions        An anal fissure is a tear in the lining of the anus. It typically causes intense, sharp pain and a small amount of bleeding. You may notice bright red blood on the toilet paper after you wipe. A fissure may form if you are constipated and try to pass a large, hard stool, or have excessive diarrhea. Some fissures form despite regular, soft stools. Some are due to trauma. Rarely, fissures can be a sign of other diseases such as Crohn's disease, infections, or cancer. In 50% of patients, anal fissure will heal by addressing the underlying problem and avoiding additional hard stool and constipation. See below for recommendations. In the other 50% of patients, anal fissures are resistant to healing due to spasm (abnormal tightening) of the internal sphincter muscle. This part of the anal muscles is under automatic control, so you may not feel the spasm. Most treatments attempt to break the cycle of spasm and pain by relaxing the internal sphincter muscle. This can be done with medication, Botox injection, and occasionally with surgery. Anal fissures themselves do not lead to cancer or other serious illnesses, however undiagnosed rectal bleeding can be a sign of other problems in the colon and rectum, such as hemorrhoids, infections, polyps, or even cancers. If bleeding is excessive, mixed with stool, or ongoing after healing of the fissure, or you have a family history of cancer, colonoscopy may be recommended. How to treat constipation and avoid straining:  Avoid constipation:  Include fruits, vegetables, beans, and whole grains in your diet each day. These foods are high in fiber. Take a fiber supplement, such as Benefiber, Citrucel, or Metamucil, every day. Read and follow all instructions on the label. Drink plenty of fluids, enough so that your urine is light yellow or clear like water.  If you have kidney, heart, or liver disease and have to limit fluids, talk with your doctor before you increase the amount of fluids you drink. Miralax or colace can be helpful to take as needed for constipation. Read and follow all instructions on the label. Use the toilet when only when you feel the urge. Do not strain when having a bowel movement. Do not sit on the toilet too long, play games on your cell phone, or read while on the commode. Get some exercise every day. Build up slowly to 30 to 60 minutes a day on 5 or more days of the week. Support your feet with a small step stool when you sit on the toilet. This helps flex your hips and places your pelvis in a squatting position. Most over-the-counter ointments and creams are not helpful, and can even cause damage to the skin  Use baby wipes instead of toilet paper to clean after a bowel movement. These products do not irritate the anus. Sit in a few inches of warm water (sitz bath) 3 times a day and after bowel movements. The warm water helps ease discomfort. Do not put soaps, salts, or shampoos in the water. Be sure to follow up in the office as instructed  Typically you will have a follow up appointment scheduled in 4-6 weeks to reassess your symptoms. If not, please call the office to schedule this. You may need to be seen sooner if you have fever, chills, excessive bleeding, increasing pain, inability to urinate, or changes in appetite. Please call the office at [phone] with any questions or concerns  If it is outside of normal hours and you have any concerns, please go to your nearest emergency room. What other options are available to treat fissures if I continue to have symptoms:  Special ointments can be prescribed to help relax the muscle spasm. Typically, these need to be compounded (mixed) at a special pharmacy. A pea-sized amount should be used around (not inside) the anus twice daily.   Botox injections of the sphincter can be performed, which will provide spasm relief for 1-2 months. Occasionally this needs to be repeated. This is typically performed as a same day surgery with anesthesia/sedation. Internal sphincterotomy is a surgery in which a portion of the internal sphincter muscle is cut, to relieve the spasm. This procedure has a high success rate, but a higher risk of complications, including rarely a loss of bowel control (incontinence). This is typically performed as a same day surgery with anesthesia/sedation. Fissurectomy and dermal advancement flap is a surgery in which healthy skin flaps are brought in from around the anus to cover the fissure and promote healing. This surgery is a bit more complex and sometimes require an overnight stay in the hospital.  The decision of which treatment is right for you depends on the chronicity and severity of your symptoms, age, gender, previous anorectal and other surgeries, underlying bowel control issues, and any suspicion for cancer or other diseases. Colonoscopy may be recommended at some point in your care if you have not had one recently or bleeding continues after the fissure heals    Please talk to your colorectal surgeon about any health conditions or concerns you may have regarding your anal fissure treatment.

## 2023-03-15 NOTE — PROGRESS NOTES
[facility] COLORECTAL SURGERY  [address]  Dept: [phone]  Dept Fax: [phone]  Loc: [phone]    Visit Date: 3/15/2023    Mary Anne Mckinney is a 30 y.o. male who presents today for: New Patient (Referral from Dr. Marie Ignacio states he started bleeding this past Sunday which continued into Monday however he is not currently bleeding, patient denies pain but states he has discomfort, states he has frequent diarrhea-states he feels that he has a lactose intolerance, denies ever having a colonoscopy )      HPI:       Mary Anne Mckinney is a 30 y.o. male referred by Dr. Alexander Tsang for anorectal discomfort. Patient has had 5 days of anorectal pain and discomfort. Symptoms occur after BMs. Bleeding: yes  Constipation: yes  Patient has tried: none  Previous similar symptoms: no  Previous anorectal surgeries: no    Denies fever, chills, abd pain, nausea, emesis, weight loss. Patient's problem list, medications, past medical, surgical, family, and social histories were reviewed and updated in the chart as indicated today. Past Medical History:   Diagnosis Date    Rosacea 3/14/2023       Past Surgical History:   Procedure Laterality Date    DENTAL SURGERY         Family History: Family history of colorectal cancer/polyps: no    Social History:   Social History     Tobacco Use    Smoking status: Never    Smokeless tobacco: Never    Tobacco comments:     I don't smoke   Substance Use Topics    Alcohol use: Yes     Alcohol/week: 4.0 standard drinks     Types: 4 Cans of beer per week     Comment: 3-4/Week      Tobacco cessation counseling provided as appropriate. REVIEW OF SYSTEMS:    Pertinent positives and negatives are mentioned in the HPI above. Otherwise, all other systems were reviewed and negative.       Objective:     Physical Exam   /79   Pulse 74   Temp 98.2 °F (36.8 °C) (Infrared) Resp 16   Ht 5' 6.5\" (1.689 m)   Wt 152 lb (68.9 kg)   SpO2 100%   BMI 24.17 kg/m²   Constitutional: Appears well-developed and well-nourished. Grooming appropriate. No gross deformities. Body mass index is 24.17 kg/m². Eyes: No scleral icterus. Conjunctiva/lids normal. Vision intact grossly. Pupils equal/symmetric, reactive bilaterally. ENT: External ears/nose without defect, scars, or masses. Hearing grossly intact. No facial deformity. Lips normal, normal dentition. Neck: No masses. Trachea midline. No crepitus. Thyroid not enlarged. Cardiovascular: Normal rate. No peripheral edema. Abdominal aorta normal size to palpation. Pulmonary/Chest: Effort normal. No respiratory distress. No wheezes. No use of accessory muscles. Musculoskeletal: Normal range of motion x all 4 extremities and head/neck, without deformity, pain, or crepitus, with normal strength and tone. Normal gait. Nails without clubbing or cyanosis. Neurological: Alert and oriented to person, place, and time. No gross deficits. Sensation intact. Skin: Skin is dry. No rashes noted. No pallor. No induration of nodules. Psychiatric: Normal mood and affect. Behavior normal. Oriented to person, place, and time. Judgment and insight reasonable. Abdominal/wound: soft, notnender    RECTAL EXAM:    Chaperone/MA present in room during entire exam. Patient was placed in lateral or knee-chest positioning depending on comfort. Exam table manipulated for proper visualization and lighting. Buttocks spread.      Inspection reveals: anterior midline fissure confirmed by cotton tip swab palpation    Digital exam and anoscopy deferred due to acute pain      Labs: none  Radiology: none    Last colonoscopy: none      Assessment/Plan:     A/P:  New problem(s): Anal fissure  Established problem(s): none  Additional workup/treatment planned: Medical therapy with prescription calcium channel blocker ointment, fiber supplementation, sitz baths, improving dietary and lifestyle choices  Risk of complications/morbidity: moderate    Patient has signs and symptoms consistent with anal fissure. Exam reveals anterior midline acute anal fissure. We will start with conservative management, including fiber supplementation, water, healthy fruits and vegetables, and medical management with prescription topical calcium channel blocker ointment. Discussed the use of the prescription ointment and that it will need to be obtained from a compounding pharmacy, which my office will arrange. If symptoms do not improve in 6-8 weeks, patient may require more invasive treatment options, such as Botox injection, partial sphincterotomy, or fissurectomy with anocutaneous advancement flap. We briefly discussed operative risks of these various options. Patient understands the need for full anorectal exam in the future after resolution of symptoms (or colonoscopy depending on other risk factors for colon cancer). I provided written information in the After Visit Summary AVS Regarding: Anal fissure    DISPOSITION:  F/u in 6 weeks for symptom reassessment    My findings will be relayed to consulting practitioner or PCP via Epic    Note completed using dictation software, please excuse any errors.     Electronically signed by Hadley Maldonado MD on 3/15/2023 at 2:00 PM

## 2023-03-15 NOTE — TELEPHONE ENCOUNTER
Spoke to [facility] to order compound ointment consisting of Nifedipine 0.3%/Lidocaine 5% 30 grams with instructions to apply a pea size amount BID and 2 additional refills.

## 2023-05-02 ENCOUNTER — PATIENT MESSAGE (OUTPATIENT)
Dept: PRIMARY CARE CLINIC | Age: 38
End: 2023-05-02

## 2023-05-02 RX ORDER — DOXYCYCLINE HYCLATE 100 MG/1
100 CAPSULE ORAL 2 TIMES DAILY
Qty: 60 CAPSULE | Refills: 3 | Status: SHIPPED | OUTPATIENT
Start: 2023-05-02

## 2023-05-02 NOTE — TELEPHONE ENCOUNTER
From: Yogesh Cardenas  To: Dr. Jordan Davenportph: 5/2/2023 11:39 AM EDT  Subject: Refill on Doxycycline     Lanie Paredes     I'm trying to refill my prescription for Dexycycline. but I can't do it online. My Rosacea is flaring up really badly and I only have on hand medication that is 3years old. I wanted to see If I can get a new batch of medicine?      Thank you,

## 2023-05-03 ENCOUNTER — OFFICE VISIT (OUTPATIENT)
Dept: SURGERY | Age: 38
End: 2023-05-03
Payer: COMMERCIAL

## 2023-05-03 VITALS
WEIGHT: 151 LBS | SYSTOLIC BLOOD PRESSURE: 149 MMHG | BODY MASS INDEX: 23.7 KG/M2 | HEART RATE: 61 BPM | DIASTOLIC BLOOD PRESSURE: 94 MMHG | HEIGHT: 67 IN | OXYGEN SATURATION: 100 % | TEMPERATURE: 98.2 F | RESPIRATION RATE: 16 BRPM

## 2023-05-03 DIAGNOSIS — K60.2 ANAL FISSURE: Primary | ICD-10-CM

## 2023-05-03 PROCEDURE — 99213 OFFICE O/P EST LOW 20 MIN: CPT | Performed by: SURGERY

## 2023-05-03 NOTE — PROGRESS NOTES
[facility] COLORECTAL SURGERY  [address]  Dept: [phone]  Dept Fax: [phone]  Loc: [phone]    Visit Date: 5/3/2023    Attila Looney is a 30 y.o. male who presents today for: Follow-up (Anal fissure 6 week follow up-patient states he is feeling much better, he has occasional bleeding-last episode was 1 month ago, patient states he feels a skin tag or hemorrhoid, denies constipation and diarrhea, he reports having discomfort with BM)      HPI:       Attila Looney is a 30 y.o. 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 Overall feeling better. Skin tag is also decreased in size. He has been using calcium channel blocker ointment as prescribed. Past Medical History:   Diagnosis Date    Rosacea 3/14/2023     Past Surgical History:   Procedure Laterality Date    DENTAL SURGERY         Current Outpatient Medications:     doxycycline hyclate (VIBRAMYCIN) 100 MG capsule, Take 1 capsule by mouth 2 times daily, Disp: 60 capsule, Rfl: 3    hydrOXYzine HCl (ATARAX) 25 MG tablet, Take 1 tablet at night before bed for anxiety/sleep. During the day can take 1/2 - 1 tablet up to twice daily as needed for anxiety, Disp: 30 tablet, Rfl: 3    metroNIDAZOLE (METROCREAM) 0.75 % cream, Apply topically 2 times daily Apply topically 2 times daily. (Patient not taking: Reported on 5/3/2023), Disp: 60 g, Rfl: 0    ketoconazole (NIZORAL) 2 % cream, Apply topically daily Apply topically daily.  (Patient not taking: Reported on 5/3/2023), Disp: 30 g, Rfl: 0    clobetasol propionate 0.05 % GEL gel, Apply topically 2 times daily (Patient not taking: Reported on 5/3/2023), Disp: , Rfl:   Allergies   Allergen Reactions    Sulfa Antibiotics Rash     Past Surgical History:   Procedure Laterality Date    DENTAL SURGERY       Family History   Problem Relation Age of Onset    Cancer Paternal Uncle         skin cancer    Lung Cancer

## 2024-03-15 ENCOUNTER — OFFICE VISIT (OUTPATIENT)
Dept: PRIMARY CARE CLINIC | Age: 39
End: 2024-03-15
Payer: COMMERCIAL

## 2024-03-15 VITALS
TEMPERATURE: 97.9 F | WEIGHT: 154.2 LBS | HEART RATE: 84 BPM | SYSTOLIC BLOOD PRESSURE: 110 MMHG | HEIGHT: 67 IN | BODY MASS INDEX: 24.2 KG/M2 | DIASTOLIC BLOOD PRESSURE: 73 MMHG | OXYGEN SATURATION: 99 %

## 2024-03-15 DIAGNOSIS — L71.9 ROSACEA: ICD-10-CM

## 2024-03-15 DIAGNOSIS — N50.82 ACUTE PAIN IN SCROTUM: ICD-10-CM

## 2024-03-15 DIAGNOSIS — L21.9 SEBORRHEIC DERMATITIS: ICD-10-CM

## 2024-03-15 DIAGNOSIS — Z00.00 ANNUAL PHYSICAL EXAM: Primary | ICD-10-CM

## 2024-03-15 DIAGNOSIS — L66.1 LICHEN PLANOPILARIS: ICD-10-CM

## 2024-03-15 PROCEDURE — 99395 PREV VISIT EST AGE 18-39: CPT | Performed by: FAMILY MEDICINE

## 2024-03-15 PROCEDURE — 99214 OFFICE O/P EST MOD 30 MIN: CPT | Performed by: FAMILY MEDICINE

## 2024-03-15 SDOH — ECONOMIC STABILITY: INCOME INSECURITY: HOW HARD IS IT FOR YOU TO PAY FOR THE VERY BASICS LIKE FOOD, HOUSING, MEDICAL CARE, AND HEATING?: NOT HARD AT ALL

## 2024-03-15 SDOH — ECONOMIC STABILITY: FOOD INSECURITY: WITHIN THE PAST 12 MONTHS, YOU WORRIED THAT YOUR FOOD WOULD RUN OUT BEFORE YOU GOT MONEY TO BUY MORE.: NEVER TRUE

## 2024-03-15 SDOH — ECONOMIC STABILITY: FOOD INSECURITY: WITHIN THE PAST 12 MONTHS, THE FOOD YOU BOUGHT JUST DIDN'T LAST AND YOU DIDN'T HAVE MONEY TO GET MORE.: NEVER TRUE

## 2024-03-15 ASSESSMENT — PATIENT HEALTH QUESTIONNAIRE - PHQ9
SUM OF ALL RESPONSES TO PHQ9 QUESTIONS 1 & 2: 0
SUM OF ALL RESPONSES TO PHQ QUESTIONS 1-9: 0
2. FEELING DOWN, DEPRESSED OR HOPELESS: NOT AT ALL
1. LITTLE INTEREST OR PLEASURE IN DOING THINGS: NOT AT ALL
SUM OF ALL RESPONSES TO PHQ QUESTIONS 1-9: 0

## 2024-03-15 NOTE — PROGRESS NOTES
Provider, Historical, MD       Past Medical History:   Diagnosis Date    Lichen planopilaris of scalp - Derm Bx in 2019 03/18/2024    Rosacea 03/14/2023        Social History     Tobacco Use    Smoking status: Never    Smokeless tobacco: Never    Tobacco comments:     I don't smoke   Substance Use Topics    Alcohol use: Yes     Alcohol/week: 4.0 standard drinks of alcohol     Types: 4 Cans of beer per week     Comment: 3-4/Week    Drug use: Never        Past Surgical History:   Procedure Laterality Date    DENTAL SURGERY          Allergies   Allergen Reactions    Sulfa Antibiotics Rash        Family History   Problem Relation Age of Onset    Cancer Paternal Uncle         skin cancer    Lung Cancer Paternal Uncle     Heart Disease Maternal Grandfather         Patient's past medical history, surgical history, family history, medications, and allergies  were all reviewed and updated as appropriate today.    Review of Systems   Constitutional:  Negative for fatigue, fever and unexpected weight change.   HENT:  Negative for congestion, ear pain and sore throat.    Eyes:  Negative for pain, itching and visual disturbance.   Respiratory:  Negative for cough, shortness of breath and wheezing.    Cardiovascular:  Negative for chest pain, palpitations and leg swelling.   Gastrointestinal:  Negative for abdominal pain, constipation, diarrhea, nausea and vomiting.   Endocrine: Negative for cold intolerance, heat intolerance, polydipsia and polyuria.   Genitourinary:  Positive for testicular pain. Negative for dysuria, frequency and hematuria.   Musculoskeletal:  Negative for arthralgias and joint swelling.   Skin:  Positive for rash.   Neurological:  Negative for dizziness and headaches.       /73 (Cuff Size: Medium Adult)   Pulse 84   Temp 97.9 °F (36.6 °C) (Oral)   Ht 1.705 m (5' 7.13\")   Wt 69.9 kg (154 lb 3.2 oz)   SpO2 99% Comment: room air  BMI 24.06 kg/m²      Physical Exam  Vitals reviewed.   Constitutional:

## 2024-03-15 NOTE — PATIENT INSTRUCTIONS
[facility] Locations - No appointment necessary.  ? indicates the location is open Saturdays in addition to Monday through Friday.   Call your preferred location for test preparation, business hours and other information you need.   [facility] accepts all insurances.  CENTRAL  EAST  [place]    ? James   [address]   [address]   [place] [address]    Ph: [phone]  [facility]   [address] [place] [address]    Ph: [phone]   ? Omar   [address]    [place] [address]    Ph: [phone]     [facility]   [address] [place] [address]    Ph: [phone] ? [address] [place] [address]   Ph: [phone]  ? [facility]   [address] [place] [address]    Ph: [phone]      [place]   [address] [place] [address]   Ph: [phone]    [place]TH    ? [facility]   [address] [place] [address]    Ph: [phone]  [facility]   [address] [place] [address]   Ph: [phone]  [place]   [address], [address]    PH: [phone]    [facility]   [address] [place] [address]    Ph: [phone]  [place]  [address] [place] [address]  Ph: [phone]  [facility]   [address] [place] [address]    Ph: [phone]

## 2024-03-18 PROBLEM — L21.9 SEBORRHEIC DERMATITIS: Status: ACTIVE | Noted: 2024-03-18

## 2024-03-18 PROBLEM — L66.10 LICHEN PLANOPILARIS: Status: ACTIVE | Noted: 2024-03-18

## 2024-03-18 PROBLEM — L66.1 LICHEN PLANOPILARIS: Status: ACTIVE | Noted: 2024-03-18

## 2024-03-18 RX ORDER — DOXYCYCLINE HYCLATE 100 MG/1
100 CAPSULE ORAL 2 TIMES DAILY
Qty: 90 CAPSULE | Refills: 0 | Status: SHIPPED | OUTPATIENT
Start: 2024-03-18 | End: 2024-05-02

## 2024-03-18 RX ORDER — CLOBETASOL PROPIONATE 0.05 MG/G
GEL TOPICAL
Qty: 60 G | Refills: 1 | Status: SHIPPED | OUTPATIENT
Start: 2024-03-18

## 2024-03-18 RX ORDER — KETOCONAZOLE 20 MG/ML
SHAMPOO TOPICAL
Qty: 120 ML | Refills: 3 | Status: SHIPPED | OUTPATIENT
Start: 2024-03-18

## 2024-03-18 ASSESSMENT — ENCOUNTER SYMPTOMS
CONSTIPATION: 0
EYE PAIN: 0
COUGH: 0
DIARRHEA: 0
SORE THROAT: 0
ABDOMINAL PAIN: 0
SHORTNESS OF BREATH: 0
EYE ITCHING: 0
WHEEZING: 0
NAUSEA: 0
VOMITING: 0

## 2024-03-19 ENCOUNTER — TELEPHONE (OUTPATIENT)
Dept: FAMILY MEDICINE CLINIC | Age: 39
End: 2024-03-19

## 2024-03-19 NOTE — TELEPHONE ENCOUNTER
SUBMITTED PA FOR Tretinoin 0.025% cream  VIA [facility] Key: [id number]  STATUS PENDING.      FOLLOW UP DONE DAILY: IF NO RESPONSE IN 3 DAYS WE WILL REFAX FOR STATUS CHECK. IF ANOTHER 3 DAYS GOES BY WITH NO RESPONSE WILL CALL INSURANCE FOR STATUS.

## 2024-03-20 NOTE — TELEPHONE ENCOUNTER
DENIAL for Tretinoin 0.025% cream; letter attached.    If this requires a response please respond to the pool ( P [facility]X [facility] MEDICATION PRE-AUTH).      Thank you please advise patient.

## 2024-03-29 ENCOUNTER — HOSPITAL ENCOUNTER (OUTPATIENT)
Dept: ULTRASOUND IMAGING | Age: 39
Discharge: HOME | End: 2024-03-29
Payer: COMMERCIAL

## 2024-03-29 DIAGNOSIS — N50.82 ACUTE PAIN IN SCROTUM: ICD-10-CM

## 2024-03-29 PROCEDURE — 76870 US EXAM SCROTUM: CPT

## 2024-03-29 PROCEDURE — 93975 VASCULAR STUDY: CPT

## 2024-12-02 DIAGNOSIS — L71.9 ROSACEA: ICD-10-CM

## 2024-12-03 RX ORDER — DOXYCYCLINE 100 MG/1
100 CAPSULE ORAL 2 TIMES DAILY
Qty: 20 CAPSULE | Refills: 4 | Status: SHIPPED | OUTPATIENT
Start: 2024-12-03

## 2024-12-03 NOTE — TELEPHONE ENCOUNTER
Medication:   Requested Prescriptions     Pending Prescriptions Disp Refills    doxycycline hyclate (VIBRAMYCIN) 100 MG capsule [Pharmacy Med Name: DOXYCYCLINE HYCLATE 100 MG CAP] 20 capsule 4     Sig: TAKE 1 CAPSULE BY MOUTH TWICE A DAY     Last Filled:  4/26/24    Last appt: 3/15/2024   Next appt: Visit date not found